# Patient Record
Sex: FEMALE | Race: WHITE | NOT HISPANIC OR LATINO | URBAN - METROPOLITAN AREA
[De-identification: names, ages, dates, MRNs, and addresses within clinical notes are randomized per-mention and may not be internally consistent; named-entity substitution may affect disease eponyms.]

---

## 2019-03-22 ENCOUNTER — ON CAMPUS - OUTPATIENT (OUTPATIENT)
Dept: URBAN - METROPOLITAN AREA HOSPITAL 14 | Facility: HOSPITAL | Age: 84
End: 2019-03-22
Payer: COMMERCIAL

## 2019-03-22 ENCOUNTER — OFFICE (OUTPATIENT)
Dept: URBAN - METROPOLITAN AREA CLINIC 32 | Facility: CLINIC | Age: 84
End: 2019-03-22

## 2019-03-22 DIAGNOSIS — K29.60 OTHER GASTRITIS WITHOUT BLEEDING: ICD-10-CM

## 2019-03-22 DIAGNOSIS — K92.1 MELENA: ICD-10-CM

## 2019-03-22 DIAGNOSIS — K92.2 GASTROINTESTINAL HEMORRHAGE, UNSPECIFIED: ICD-10-CM

## 2019-03-22 DIAGNOSIS — D62 ACUTE POSTHEMORRHAGIC ANEMIA: ICD-10-CM

## 2019-03-22 PROCEDURE — 00021: CPT

## 2019-03-22 PROCEDURE — 99234 HOSP IP/OBS SM DT SF/LOW 45: CPT | Mod: 25

## 2019-03-22 PROCEDURE — 43235 EGD DIAGNOSTIC BRUSH WASH: CPT

## 2019-10-16 ENCOUNTER — OFFICE (OUTPATIENT)
Dept: URBAN - METROPOLITAN AREA CLINIC 33 | Facility: CLINIC | Age: 84
End: 2019-10-16

## 2019-10-16 VITALS
HEART RATE: 55 BPM | DIASTOLIC BLOOD PRESSURE: 67 MMHG | WEIGHT: 171 LBS | TEMPERATURE: 97.9 F | SYSTOLIC BLOOD PRESSURE: 112 MMHG | HEIGHT: 64 IN

## 2019-10-16 DIAGNOSIS — D62 ACUTE POSTHEMORRHAGIC ANEMIA: ICD-10-CM

## 2019-10-16 PROCEDURE — 99214 OFFICE O/P EST MOD 30 MIN: CPT

## 2022-12-02 ENCOUNTER — NURSE TRIAGE (OUTPATIENT)
Dept: OTHER | Facility: CLINIC | Age: 87
End: 2022-12-02

## 2022-12-02 NOTE — TELEPHONE ENCOUNTER
Reason for Disposition   [1] Caller requesting NON-URGENT health information AND [2] PCP's office is the best resource    Protocols used: Information Only Call - No Maryana Nelson MRN- [id number] Received call from [facility]    Daughter Wai Conteh is calling looking for results of Mothers UA results. She reports she was on hold for 20 minutes and then line was disconnected and now office is closed. Mother is being treated with antibiotic for probable UTI, however she would like to know for sure if there is infection present.

## 2023-09-21 ENCOUNTER — OFFICE (OUTPATIENT)
Dept: URBAN - METROPOLITAN AREA CLINIC 34 | Facility: CLINIC | Age: 88
End: 2023-09-21
Payer: COMMERCIAL

## 2023-09-21 VITALS
DIASTOLIC BLOOD PRESSURE: 77 MMHG | TEMPERATURE: 98.2 F | HEART RATE: 82 BPM | HEIGHT: 64 IN | SYSTOLIC BLOOD PRESSURE: 134 MMHG | WEIGHT: 164 LBS

## 2023-09-21 DIAGNOSIS — Z86.79 PERSONAL HISTORY OF OTHER DISEASES OF THE CIRCULATORY SYSTEM: ICD-10-CM

## 2023-09-21 DIAGNOSIS — R54 AGE-RELATED PHYSICAL DEBILITY: ICD-10-CM

## 2023-09-21 DIAGNOSIS — R13.10 DYSPHAGIA, UNSPECIFIED: ICD-10-CM

## 2023-09-21 PROCEDURE — 99204 OFFICE O/P NEW MOD 45 MIN: CPT | Performed by: PHYSICIAN ASSISTANT

## 2023-09-21 NOTE — SERVICEHPINOTES
Ms patino is a 89 YoF with history of CHF, AF, GERD, who presents to the office accompanied by her daughter for evaluation of trouble swallowing. The patient reports she has been coughing for 6 months -- Seems to be worse when she eats, but she cites issue of "phlegm" in her throat she feels she cannot get up. Noted recent trigger of rice which caused a more severe episode of coughing and choking. She has trouble also sometimes with liquids and with swallowing pills. These symptoms have been present for about 6 months. She is presently taking BID PPI and has not noticed an improvement with this. She does not experience symptoms of classic heartburn/reflux, abdominal pain, or N/V. Her weight/appetite have been stable. No recent pneumonias, but she was recently at the hospital for dyspnea felt to be related to CHF exacerbation.br

ingrid She had an EGD in 2019 with benign findings (gastritis, hiatal hernia). 
br ingrid
She is followed by cardiologist Dr. Castillo and recently had Watchman procedure. She continues on clopidogrel. No present NSAID use.

## 2023-09-21 NOTE — SERVICENOTES
I counseled patient/family on expected/benign age-related changes in swallowing function, and cautioned on pursuing invasive procedures (EGD) with repeated anesthesia exposure given age and risk of adverse events. They agree with plan to complete barium swallow with speech pathologist.

## 2025-05-06 ENCOUNTER — APPOINTMENT (OUTPATIENT)
Dept: URBAN - METROPOLITAN AREA CLINIC 309 | Age: OVER 89
Setting detail: DERMATOLOGY
End: 2025-05-06

## 2025-05-06 DIAGNOSIS — L82.0 INFLAMED SEBORRHEIC KERATOSIS: ICD-10-CM

## 2025-05-06 PROCEDURE — OTHER MIPS QUALITY: OTHER

## 2025-05-06 PROCEDURE — OTHER COUNSELING: OTHER

## 2025-05-06 PROCEDURE — OTHER LIQUID NITROGEN: OTHER

## 2025-05-06 ASSESSMENT — LOCATION SIMPLE DESCRIPTION DERM
LOCATION SIMPLE: RIGHT CHEEK
LOCATION SIMPLE: RIGHT FOREHEAD
LOCATION SIMPLE: LEFT CHEEK
LOCATION SIMPLE: LEFT FOREHEAD
LOCATION SIMPLE: SCALP

## 2025-05-06 ASSESSMENT — LOCATION ZONE DERM
LOCATION ZONE: FACE
LOCATION ZONE: SCALP

## 2025-05-06 ASSESSMENT — LOCATION DETAILED DESCRIPTION DERM
LOCATION DETAILED: LEFT CENTRAL MALAR CHEEK
LOCATION DETAILED: RIGHT INFERIOR MEDIAL MALAR CHEEK
LOCATION DETAILED: RIGHT CENTRAL MALAR CHEEK
LOCATION DETAILED: LEFT INFERIOR FRONTAL SCALP
LOCATION DETAILED: LEFT INFERIOR CENTRAL MALAR CHEEK
LOCATION DETAILED: LEFT MEDIAL MALAR CHEEK
LOCATION DETAILED: LEFT SUPERIOR POSTAURICULAR SKIN
LOCATION DETAILED: RIGHT INFERIOR LATERAL FOREHEAD
LOCATION DETAILED: LEFT INFERIOR LATERAL FOREHEAD
LOCATION DETAILED: RIGHT INFERIOR CENTRAL MALAR CHEEK